# Patient Record
Sex: FEMALE | Race: WHITE | NOT HISPANIC OR LATINO | Employment: PART TIME | URBAN - METROPOLITAN AREA
[De-identification: names, ages, dates, MRNs, and addresses within clinical notes are randomized per-mention and may not be internally consistent; named-entity substitution may affect disease eponyms.]

---
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
## 2020-07-08 ENCOUNTER — OFFICE VISIT (OUTPATIENT)
Dept: CARDIOLOGY | Facility: MEDICAL CENTER | Age: 20
End: 2020-07-08
Payer: COMMERCIAL

## 2020-07-08 VITALS
SYSTOLIC BLOOD PRESSURE: 106 MMHG | HEART RATE: 77 BPM | BODY MASS INDEX: 18.88 KG/M2 | OXYGEN SATURATION: 97 % | DIASTOLIC BLOOD PRESSURE: 70 MMHG | WEIGHT: 100 LBS | HEIGHT: 61 IN

## 2020-07-08 DIAGNOSIS — Z00.00 PREVENTATIVE HEALTH CARE: ICD-10-CM

## 2020-07-08 DIAGNOSIS — Q23.1 BICUSPID AORTIC VALVE: ICD-10-CM

## 2020-07-08 DIAGNOSIS — Z87.74 HISTORY OF COARCTATION OF AORTA: ICD-10-CM

## 2020-07-08 PROCEDURE — 99213 OFFICE O/P EST LOW 20 MIN: CPT | Performed by: INTERNAL MEDICINE

## 2020-07-08 ASSESSMENT — ENCOUNTER SYMPTOMS
LOSS OF CONSCIOUSNESS: 0
STRIDOR: 0
COUGH: 0
CONSTITUTIONAL NEGATIVE: 1
EYES NEGATIVE: 1
BRUISES/BLEEDS EASILY: 0
GASTROINTESTINAL NEGATIVE: 1
CARDIOVASCULAR NEGATIVE: 1
MUSCULOSKELETAL NEGATIVE: 1
SHORTNESS OF BREATH: 0
WEAKNESS: 0
WHEEZING: 0
PALPITATIONS: 0
CLAUDICATION: 0
PND: 0
FEVER: 0
CHILLS: 0
ORTHOPNEA: 0
SORE THROAT: 0
HEMOPTYSIS: 0
SPUTUM PRODUCTION: 0
RESPIRATORY NEGATIVE: 1
DIZZINESS: 0
NEUROLOGICAL NEGATIVE: 1

## 2020-07-08 ASSESSMENT — FIBROSIS 4 INDEX: FIB4 SCORE: 0.32

## 2020-07-08 NOTE — PROGRESS NOTES
Chief Complaint   Patient presents with   • Follow-Up     Biscupid aortic valve       Subjective:   Jolly Stanton is a 19 y.o. female who presents today as a follow-up for bicuspid valve and report repaired coarctation.  Her last echocardiogram showed no residual stenosis and no significant stenosis across her aortic valve.  She has been functionally active not with limitations.  She has no chest pain.  Her blood pressure is controlled not on medications.  She is busy going to college at [employer] studying theater.    History reviewed. No pertinent past medical history.  History reviewed. No pertinent surgical history.  History reviewed. No pertinent family history.  Social History     Socioeconomic History   • Marital status: Single     Spouse name: Not on file   • Number of children: Not on file   • Years of education: Not on file   • Highest education level: Not on file   Occupational History   • Not on file   Social Needs   • Financial resource strain: Not on file   • Food insecurity     Worry: Not on file     Inability: Not on file   • Transportation needs     Medical: Not on file     Non-medical: Not on file   Tobacco Use   • Smoking status: Never Smoker   • Smokeless tobacco: Never Used   Substance and Sexual Activity   • Alcohol use: No   • Drug use: No   • Sexual activity: Not on file   Lifestyle   • Physical activity     Days per week: Not on file     Minutes per session: Not on file   • Stress: Not on file   Relationships   • Social connections     Talks on phone: Not on file     Gets together: Not on file     Attends [employer] service: Not on file     Active member of club or organization: Not on file     Attends meetings of clubs or organizations: Not on file     Relationship status: Not on file   • Intimate partner violence     Fear of current or ex partner: Not on file     Emotionally abused: Not on file     Physically abused: Not on file     Forced sexual activity: Not on file   Other Topics Concern  "  • Not on file   Social History Narrative   • Not on file     Allergies   Allergen Reactions   • Asa [Aspirin] Anaphylaxis     Itchy throat,wheezing    • Ibuprofen      Facial and throat swelling   • Penicillins      hives     No outpatient encounter medications on file as of 7/8/2020.     No facility-administered encounter medications on file as of 7/8/2020.      Review of Systems   Constitutional: Negative.  Negative for chills, fever and malaise/fatigue.   HENT: Negative.  Negative for sore throat.    Eyes: Negative.    Respiratory: Negative.  Negative for cough, hemoptysis, sputum production, shortness of breath, wheezing and stridor.    Cardiovascular: Negative.  Negative for chest pain, palpitations, orthopnea, claudication, leg swelling and PND.   Gastrointestinal: Negative.    Genitourinary: Negative.    Musculoskeletal: Negative.    Skin: Negative.    Neurological: Negative.  Negative for dizziness, loss of consciousness and weakness.   Endo/Heme/Allergies: Negative.  Does not bruise/bleed easily.   All other systems reviewed and are negative.       Objective:   /70 (BP Location: Right arm, Patient Position: Sitting, BP Cuff Size: Adult)   Pulse 77   Ht 1.549 m (5' 1\")   Wt 45.4 kg (100 lb)   SpO2 97%   BMI 18.89 kg/m²     Physical Exam   Constitutional: She appears well-developed and well-nourished. No distress.   HENT:   Head: Normocephalic and atraumatic.   Right Ear: External ear normal.   Left Ear: External ear normal.   Nose: Nose normal.   Mouth/Throat: No oropharyngeal exudate.   Eyes: Pupils are equal, round, and reactive to light. Conjunctivae and EOM are normal. Right eye exhibits no discharge. Left eye exhibits no discharge. No scleral icterus.   Neck: Neck supple. No JVD present.   Cardiovascular: Normal rate, regular rhythm and intact distal pulses. Exam reveals no gallop and no friction rub.   No murmur heard.  Pulmonary/Chest: Effort normal. No stridor. No respiratory distress. " She has no wheezes. She has no rales. She exhibits no tenderness.   Abdominal: Soft. She exhibits no distension. There is no guarding.   Musculoskeletal: Normal range of motion.         General: No tenderness, deformity or edema.   Neurological: She is alert. She has normal reflexes. She displays normal reflexes. No cranial nerve deficit. She exhibits normal muscle tone. Coordination normal.   Skin: Skin is warm and dry. No rash noted. She is not diaphoretic. No erythema. No pallor.   Psychiatric: She has a normal mood and affect. Her behavior is normal. Judgment and thought content normal.   Nursing note and vitals reviewed.      Assessment:     1. Bicuspid aortic valve     2. History of coarctation of aorta     3. Preventative health care         Medical Decision Making:  Today's Assessment / Status / Plan:   19-year-old female with bicuspid aortic valve disease with coarctation of the aorta.  At this point she is doing well.  We discussed her risk of COVID and that she is low risk and not at risk for the disease given her cardiac history.  She should follow the same precautions as everyone else.  She is otherwise doing well we will see her back in 12 months.

## 2020-07-30 ENCOUNTER — HOSPITAL ENCOUNTER (OUTPATIENT)
Dept: LAB | Facility: MEDICAL CENTER | Age: 20
End: 2020-07-30
Attending: FAMILY MEDICINE
Payer: COMMERCIAL

## 2020-07-30 ENCOUNTER — OFFICE VISIT (OUTPATIENT)
Dept: MEDICAL GROUP | Age: 20
End: 2020-07-30
Payer: COMMERCIAL

## 2020-07-30 VITALS
BODY MASS INDEX: 18.07 KG/M2 | DIASTOLIC BLOOD PRESSURE: 70 MMHG | HEIGHT: 62 IN | HEART RATE: 71 BPM | OXYGEN SATURATION: 100 % | TEMPERATURE: 96.9 F | WEIGHT: 98.2 LBS | SYSTOLIC BLOOD PRESSURE: 110 MMHG

## 2020-07-30 DIAGNOSIS — Z00.00 ANNUAL PHYSICAL EXAM: ICD-10-CM

## 2020-07-30 DIAGNOSIS — Z23 NEED FOR VACCINATION: ICD-10-CM

## 2020-07-30 DIAGNOSIS — R68.89 THIN BUILD: ICD-10-CM

## 2020-07-30 DIAGNOSIS — Z11.3 SCREENING EXAMINATION FOR SEXUALLY TRANSMITTED DISEASE: ICD-10-CM

## 2020-07-30 LAB
25(OH)D3 SERPL-MCNC: 30 NG/ML (ref 30–100)
ALBUMIN SERPL BCP-MCNC: 4.4 G/DL (ref 3.2–4.9)
ALBUMIN/GLOB SERPL: 1.9 G/DL
ALP SERPL-CCNC: 59 U/L (ref 30–99)
ALT SERPL-CCNC: 10 U/L (ref 2–50)
ANION GAP SERPL CALC-SCNC: 13 MMOL/L (ref 7–16)
AST SERPL-CCNC: 12 U/L (ref 12–45)
BILIRUB SERPL-MCNC: 0.2 MG/DL (ref 0.1–1.5)
BUN SERPL-MCNC: 12 MG/DL (ref 8–22)
CALCIUM SERPL-MCNC: 9.7 MG/DL (ref 8.5–10.5)
CHLORIDE SERPL-SCNC: 103 MMOL/L (ref 96–112)
CHOLEST SERPL-MCNC: 119 MG/DL (ref 100–199)
CO2 SERPL-SCNC: 22 MMOL/L (ref 20–33)
CREAT SERPL-MCNC: 0.67 MG/DL (ref 0.5–1.4)
ERYTHROCYTE [DISTWIDTH] IN BLOOD BY AUTOMATED COUNT: 36.5 FL (ref 35.9–50)
FASTING STATUS PATIENT QL REPORTED: NORMAL
GLOBULIN SER CALC-MCNC: 2.3 G/DL (ref 1.9–3.5)
GLUCOSE SERPL-MCNC: 86 MG/DL (ref 65–99)
HAV IGM SERPL QL IA: NORMAL
HBV CORE IGM SER QL: NORMAL
HBV SURFACE AG SER QL: NORMAL
HCT VFR BLD AUTO: 37.3 % (ref 37–47)
HCV AB SER QL: NORMAL
HDLC SERPL-MCNC: 52 MG/DL
HGB BLD-MCNC: 11.6 G/DL (ref 12–16)
HIV 1+2 AB+HIV1 P24 AG SERPL QL IA: NORMAL
LDLC SERPL CALC-MCNC: 48 MG/DL
MCH RBC QN AUTO: 25.4 PG (ref 27–33)
MCHC RBC AUTO-ENTMCNC: 31.1 G/DL (ref 33.6–35)
MCV RBC AUTO: 81.6 FL (ref 81.4–97.8)
PLATELET # BLD AUTO: 283 K/UL (ref 164–446)
PMV BLD AUTO: 10.8 FL (ref 9–12.9)
POTASSIUM SERPL-SCNC: 3.7 MMOL/L (ref 3.6–5.5)
PROT SERPL-MCNC: 6.7 G/DL (ref 6–8.2)
RBC # BLD AUTO: 4.57 M/UL (ref 4.2–5.4)
SODIUM SERPL-SCNC: 138 MMOL/L (ref 135–145)
T3FREE SERPL-MCNC: 3.04 PG/ML (ref 2–4.4)
T4 FREE SERPL-MCNC: 1.25 NG/DL (ref 0.93–1.7)
TREPONEMA PALLIDUM IGG+IGM AB [PRESENCE] IN SERUM OR PLASMA BY IMMUNOASSAY: NORMAL
TRIGL SERPL-MCNC: 96 MG/DL (ref 0–149)
TSH SERPL DL<=0.005 MIU/L-ACNC: 1.75 UIU/ML (ref 0.38–5.33)
VIT B12 SERPL-MCNC: 672 PG/ML (ref 211–911)
WBC # BLD AUTO: 7.2 K/UL (ref 4.8–10.8)

## 2020-07-30 PROCEDURE — 87491 CHLMYD TRACH DNA AMP PROBE: CPT

## 2020-07-30 PROCEDURE — 90471 IMMUNIZATION ADMIN: CPT | Performed by: FAMILY MEDICINE

## 2020-07-30 PROCEDURE — 84443 ASSAY THYROID STIM HORMONE: CPT

## 2020-07-30 PROCEDURE — 80074 ACUTE HEPATITIS PANEL: CPT

## 2020-07-30 PROCEDURE — 80061 LIPID PANEL: CPT

## 2020-07-30 PROCEDURE — 87389 HIV-1 AG W/HIV-1&-2 AB AG IA: CPT

## 2020-07-30 PROCEDURE — 84481 FREE ASSAY (FT-3): CPT

## 2020-07-30 PROCEDURE — 85027 COMPLETE CBC AUTOMATED: CPT

## 2020-07-30 PROCEDURE — 99395 PREV VISIT EST AGE 18-39: CPT | Mod: 25 | Performed by: FAMILY MEDICINE

## 2020-07-30 PROCEDURE — 84439 ASSAY OF FREE THYROXINE: CPT

## 2020-07-30 PROCEDURE — 87591 N.GONORRHOEAE DNA AMP PROB: CPT

## 2020-07-30 PROCEDURE — 90621 MENB-FHBP VACC 2/3 DOSE IM: CPT | Performed by: FAMILY MEDICINE

## 2020-07-30 PROCEDURE — 80053 COMPREHEN METABOLIC PANEL: CPT

## 2020-07-30 PROCEDURE — 82306 VITAMIN D 25 HYDROXY: CPT

## 2020-07-30 PROCEDURE — 86780 TREPONEMA PALLIDUM: CPT

## 2020-07-30 PROCEDURE — 36415 COLL VENOUS BLD VENIPUNCTURE: CPT

## 2020-07-30 PROCEDURE — 82607 VITAMIN B-12: CPT

## 2020-07-30 ASSESSMENT — LIFESTYLE VARIABLES
DURING THE PAST 12 MONTHS, ON HOW MANY DAYS DID YOU USE ANY TOBACCO OR NICOTINE PRODUCTS: 0
DURING THE PAST 12 MONTHS, ON HOW MANY DAYS DID YOU USE ANYTHING ELSE TO GET HIGH: 0
DURING THE PAST 12 MONTHS, ON HOW MANY DAYS DID YOU DRINK MORE THAN A FEW SIPS OF BEER, WINE, OR ANY DRINK CONTAINING ALCOHOL: 0
PART A TOTAL SCORE: 0
HAVE YOU EVER RIDDEN IN A CAR DRIVEN BY SOMEONE WHO WAS HIGH OR HAD BEEN USING ALCOHOL OR DRUGS: NO
DURING THE PAST 12 MONTHS, ON HOW MANY DAYS DID YOU USE ANY MARIJUANA: 0

## 2020-07-30 ASSESSMENT — FIBROSIS 4 INDEX: FIB4 SCORE: 0.32

## 2020-07-30 ASSESSMENT — PATIENT HEALTH QUESTIONNAIRE - PHQ9: CLINICAL INTERPRETATION OF PHQ2 SCORE: 0

## 2020-07-30 NOTE — LETTER
July 30, 2020       Patient: Jolly Stanton   YOB: 2000   Date of Visit: 7/30/2020         To Whom It May Concern:    In my medical opinion, I recommend that Jolly Stanton should only do online school   And not in person due to underlying heart condition.    If you have any questions or concerns, please don't hesitate to call [phone]        Sincerely,        Rafael Cedillo M.D.  Electronically Signed

## 2020-07-30 NOTE — PROGRESS NOTES
Subjective:     CC:   Chief Complaint   Patient presents with   • Annual Exam       HPI:   Jolly Stanton is a 19 y.o. female who presents for annual exam    Patient has GYN provider: No   Last Pap Smear: NA   H/O Abnormal Pap: No  Last Mammogram: NA  Last Bone Density Test: NA  Last Colorectal Cancer Screening: NA  Last Tdap:01/10/2012    Exercise: sporadic irregular exercise, <half hour walking weekly  Diet: picky eater    Patient's last menstrual period was 07/09/2020 (approximate).  Hx STDs: No  Birth control: none  Menses every month with 5 days with light bleeding.  Denies cramping.  No significant bloating/fluid retention, pelvic pain, or dyspareunia. No abnormal vaginal discharge.  No breast tenderness, mass, nipple discharge, changes in size or contour, or abnormal cyclic discomfort.    OB History   No obstetric history on file.      She  reports previously being sexually active.    She  has no past medical history on file.  She  has a past surgical history that includes other cardiac surgery and other cardiac surgery.    Family History   Problem Relation Age of Onset   • No Known Problems Mother    • Diabetes Father    • Hypertension Father    • No Known Problems Sister    • Asthma Brother      Social History     Tobacco Use   • Smoking status: Never Smoker   • Smokeless tobacco: Never Used   Substance Use Topics   • Alcohol use: No   • Drug use: No       Patient Active Problem List    Diagnosis Date Noted   • Thin build 07/30/2020   • Screening examination for sexually transmitted disease 01/11/2019   • Bicuspid aortic valve 01/11/2019   • History of coarctation of aorta 01/11/2019     Current Outpatient Medications   Medication Sig Dispense Refill   • norgestrel-ethinyl estradiol (LO-OVRAL) 0.3-30 MG-MCG Tab Take 1 Tab by mouth every day. 28 Tab 8     No current facility-administered medications for this visit.      Allergies   Allergen Reactions   • Asa [Aspirin] Anaphylaxis     Itchy  "throat,wheezing    • Ibuprofen      Facial and throat swelling   • Penicillins      hives       Review of Systems   Constitutional: Negative for fever, chills and malaise/fatigue.   HENT: Negative for congestion.    Eyes: Negative for pain.   Respiratory: Negative for cough and shortness of breath.    Cardiovascular: Negative for chest pain and leg swelling.   Gastrointestinal: Negative for nausea, vomiting, abdominal pain and diarrhea.   Genitourinary: Negative for dysuria and hematuria.   Skin: Negative for rash.   Neurological: Negative for dizziness, focal weakness and headaches.   Endo/Heme/Allergies: Does not bruise/bleed easily.   Psychiatric/Behavioral: Negative for depression.  The patient is not nervous/anxious.      Objective:   /70 (BP Location: Left arm, Patient Position: Sitting, BP Cuff Size: Adult)   Pulse 71   Temp 36.1 °C (96.9 °F) (Temporal)   Ht 1.575 m (5' 2\")   Wt 44.5 kg (98 lb 3.2 oz)   LMP 07/09/2020 (Approximate)   SpO2 100%   Breastfeeding No   BMI 17.96 kg/m²     Wt Readings from Last 4 Encounters:   07/30/20 44.5 kg (98 lb 3.2 oz) (2 %, Z= -1.98)*   07/08/20 45.4 kg (100 lb) (3 %, Z= -1.81)*   04/19/19 49.9 kg (110 lb) (19 %, Z= -0.89)*   01/11/19 52.6 kg (116 lb) (32 %, Z= -0.46)*     * Growth percentiles are based on CDC (Girls, 2-20 Years) data.            Physical Exam:  Constitutional: Well-developed and well-nourished. Not diaphoretic. No distress.   Skin: Skin is warm and dry. No rash noted.  Head: Atraumatic without lesions.  Eyes: Conjunctivae and extraocular motions are normal. Pupils are equal, round, and reactive to light. No scleral icterus.   Ears:  External ears unremarkable. Tympanic membranes clear and intact.  Nose: Nares patent. Septum midline. Turbinates without erythema nor edema. No discharge.   Mouth/Throat: Tongue normal. Oropharynx is clear and moist. Posterior pharynx without erythema or exudates.  Neck: Supple, trachea midline. Normal range of " motion. No thyromegaly present. No lymphadenopathy--cervical or supraclavicular.  Cardiovascular: Regular rate and rhythm, S1 and S2 without murmur, rubs, or gallops.      Abdomen: Soft, non tender, and without distention. Active bowel sounds in all four quadrants. No rebound, guarding, masses or HSM.    Extremities: No cyanosis, clubbing, erythema, nor edema. Distal pulses intact and symmetric.   Musculoskeletal: All major joints AROM full in all directions without pain.  Neurological: Alert and oriented x 3. Grossly non-focal. Strength and sensation grossly intact. DTRs 2+/3 and symmetric.   Psychiatric:  Behavior, mood, and affect are appropriate.      Assessment and Plan:     1. Annual physical exam    Health maintenance:    Labs per orders  Immunizations per orders  Patient counseled about skin care, diet, supplements, and exercise.  Discussed  STD prevention       Pt. Denied any  Personal history of or current deep vein thrombosis or pulmonary embolus)   Known thrombogenic mutations (eg, factor V Leiden mutation; prothrombin mutation; protein S, protein C,  Anticipated or recent major surgery with prolonged immobilization   History of stroke   Complicated valvular heart disease   History of or current ischemic heart disease (   diabetes mellitus with nephropathy, retinopathy, or neuropathy)   Active liver disease   History of or current benign or malignant liver tumor   Known or suspected carcinoma of the breast   Migraine with aura (with or without headache)  Known or suspected pregnancy       - Comp Metabolic Panel; Future  - CBC WITHOUT DIFFERENTIAL; Future  - Lipid Profile; Future  - TSH+FREE T4  - T3 FREE; Future  - VITAMIN D,25 HYDROXY; Future  - VITAMIN B12; Future  - norgestrel-ethinyl estradiol (LO-OVRAL) 0.3-30 MG-MCG Tab; Take 1 Tab by mouth every day.  Dispense: 28 Tab; Refill: 8    2. Need for vaccination    - Meningococcal Vaccine Serogroup B 2-3 Dose (TRUMENBA)    3. Thin build  We will obtain  routine labs including thyroid.    4. Screening examination for sexually transmitted disease    - Chlamydia & N. Gonorrhoeae By PCR; Future  - HIV AG/AB COMBO ASSAY SCREENING; Future  - HEPATITIS PANEL ACUTE (4 COMPONENTS); Future  - TRICHOMONAS VAGINALIS BY TMA; Future      Follow-up: Return in about 6 months (around 1/30/2021) for Reevaluation.

## 2020-09-29 DIAGNOSIS — Z00.00 ANNUAL PHYSICAL EXAM: ICD-10-CM

## 2020-09-29 NOTE — TELEPHONE ENCOUNTER
Received request via: Pharmacy    Was the patient seen in the last year in this department? Yes    Does the patient have an active prescription (recently filled or refills available) for medication(s) requested? Yes, Patient wants script sent to different pharmacy

## 2020-12-31 ENCOUNTER — TELEPHONE (OUTPATIENT)
Dept: CARDIOLOGY | Facility: MEDICAL CENTER | Age: 20
End: 2020-12-31

## 2020-12-31 NOTE — TELEPHONE ENCOUNTER
Informed Jolly that at this time patient is not having symptoms beyond sore throat and a headache, use Tylenol for pain, stay hydrated, and well fed. Consider pedialyte in oral intake is not great. If symptoms get worse such as SOB, dizzy, lightheaded, chest pain, may want to consider going to ER for monitoring.

## 2020-12-31 NOTE — TELEPHONE ENCOUNTER
RO    Pts mother Jolly called stating Pt has tested positive for Covid and has a bicuspid valve and is wondering if there is anything specific she should be doing. Please give Jolly a call back at [phone].    Thank you

## 2021-07-12 ENCOUNTER — TELEPHONE (OUTPATIENT)
Dept: CARDIOLOGY | Facility: MEDICAL CENTER | Age: 21
End: 2021-07-12

## 2021-07-12 NOTE — LETTER
PROCEDURE/SURGERY CLEARANCE FORM      Encounter Date: 2021    Patient: Jolly Stanton  YOB: 2000    CARDIOLOGIST: Jose Graham M.D.    REFERRING DOCTOR:  No ref. provider found    The above patient is cleared to have the following procedure/surgery:                                            Additional comments: Ok to proceed, low risk.         Electronically signed        MD Signature   Jose Graham M.D.

## 2021-07-12 NOTE — TELEPHONE ENCOUNTER
RO    Patient called and needs to get clearance for a procedure on Wednesday. There will be a moderate sedation. Please call her ASAP.    Thank you,    Lashonda LOVELL

## 2021-07-29 ENCOUNTER — TELEPHONE (OUTPATIENT)
Dept: CARDIOLOGY | Facility: MEDICAL CENTER | Age: 21
End: 2021-07-29

## 2021-07-29 DIAGNOSIS — Q23.1 BICUSPID AORTIC VALVE: ICD-10-CM

## 2021-07-29 NOTE — TELEPHONE ENCOUNTER
RO    Patient mom called and would like for RO to order an Echo if needed now so patient can get it done before appt on 8/11 as she goes back to college on 8/12. Please let them know if ordered. Please call mom Jolly [phone]    Thank you,    Lashonda LOVELL

## 2021-07-31 NOTE — TELEPHONE ENCOUNTER
You  Jose Graham M.D. 23 hours ago (5:04 PM)     To Ro, does patient need repeat Echo? will need to be ordered STAT.     Thanks    Message text      Jose Graham M.D.  You 23 minutes ago (4:39 PM)     Please report a repeat echocardiogram.    Message text      LVM for Jolly, provided number for imaging scheduler

## 2021-08-03 ENCOUNTER — HOSPITAL ENCOUNTER (OUTPATIENT)
Dept: CARDIOLOGY | Facility: MEDICAL CENTER | Age: 21
End: 2021-08-03
Attending: INTERNAL MEDICINE
Payer: COMMERCIAL

## 2021-08-03 DIAGNOSIS — Q23.1 BICUSPID AORTIC VALVE: ICD-10-CM

## 2021-08-03 LAB
LV EJECT FRACT  99904: 60
LV EJECT FRACT MOD 2C 99903: 62.56
LV EJECT FRACT MOD 4C 99902: 64.54
LV EJECT FRACT MOD BP 99901: 63.78

## 2021-08-03 PROCEDURE — 93306 TTE W/DOPPLER COMPLETE: CPT | Mod: 26 | Performed by: INTERNAL MEDICINE

## 2021-08-03 PROCEDURE — 93306 TTE W/DOPPLER COMPLETE: CPT

## 2021-08-06 ENCOUNTER — HOSPITAL ENCOUNTER (OUTPATIENT)
Dept: LAB | Facility: MEDICAL CENTER | Age: 21
End: 2021-08-06
Attending: FAMILY MEDICINE
Payer: COMMERCIAL

## 2021-08-06 ENCOUNTER — OFFICE VISIT (OUTPATIENT)
Dept: CARDIOLOGY | Facility: MEDICAL CENTER | Age: 21
End: 2021-08-06
Payer: COMMERCIAL

## 2021-08-06 ENCOUNTER — OFFICE VISIT (OUTPATIENT)
Dept: MEDICAL GROUP | Age: 21
End: 2021-08-06
Payer: COMMERCIAL

## 2021-08-06 VITALS
SYSTOLIC BLOOD PRESSURE: 90 MMHG | RESPIRATION RATE: 16 BRPM | DIASTOLIC BLOOD PRESSURE: 70 MMHG | WEIGHT: 100.2 LBS | TEMPERATURE: 98.7 F | HEIGHT: 62 IN | OXYGEN SATURATION: 97 % | HEART RATE: 96 BPM | BODY MASS INDEX: 18.44 KG/M2

## 2021-08-06 VITALS
SYSTOLIC BLOOD PRESSURE: 100 MMHG | DIASTOLIC BLOOD PRESSURE: 72 MMHG | OXYGEN SATURATION: 96 % | HEART RATE: 97 BPM | BODY MASS INDEX: 18.77 KG/M2 | HEIGHT: 62 IN | WEIGHT: 102 LBS

## 2021-08-06 DIAGNOSIS — Z87.74 HISTORY OF COARCTATION OF AORTA: ICD-10-CM

## 2021-08-06 DIAGNOSIS — Q23.1 BICUSPID AORTIC VALVE: ICD-10-CM

## 2021-08-06 DIAGNOSIS — Z11.3 SCREENING EXAMINATION FOR SEXUALLY TRANSMITTED DISEASE: ICD-10-CM

## 2021-08-06 DIAGNOSIS — Z00.00 ANNUAL PHYSICAL EXAM: ICD-10-CM

## 2021-08-06 LAB
25(OH)D3 SERPL-MCNC: 17 NG/ML (ref 30–100)
ALBUMIN SERPL BCP-MCNC: 4.5 G/DL (ref 3.2–4.9)
ALBUMIN/GLOB SERPL: 1.7 G/DL
ALP SERPL-CCNC: 76 U/L (ref 30–99)
ALT SERPL-CCNC: 11 U/L (ref 2–50)
ANION GAP SERPL CALC-SCNC: 10 MMOL/L (ref 7–16)
AST SERPL-CCNC: 15 U/L (ref 12–45)
BILIRUB SERPL-MCNC: 0.3 MG/DL (ref 0.1–1.5)
BUN SERPL-MCNC: 8 MG/DL (ref 8–22)
CALCIUM SERPL-MCNC: 10 MG/DL (ref 8.5–10.5)
CHLORIDE SERPL-SCNC: 104 MMOL/L (ref 96–112)
CHOLEST SERPL-MCNC: 152 MG/DL (ref 100–199)
CO2 SERPL-SCNC: 24 MMOL/L (ref 20–33)
CREAT SERPL-MCNC: 0.65 MG/DL (ref 0.5–1.4)
ERYTHROCYTE [DISTWIDTH] IN BLOOD BY AUTOMATED COUNT: 44.9 FL (ref 35.9–50)
FASTING STATUS PATIENT QL REPORTED: NORMAL
GLOBULIN SER CALC-MCNC: 2.7 G/DL (ref 1.9–3.5)
GLUCOSE SERPL-MCNC: 90 MG/DL (ref 65–99)
HAV IGM SERPL QL IA: NORMAL
HBV CORE IGM SER QL: NORMAL
HBV SURFACE AG SER QL: NORMAL
HCT VFR BLD AUTO: 40.4 % (ref 37–47)
HCV AB SER QL: NORMAL
HDLC SERPL-MCNC: 58 MG/DL
HGB BLD-MCNC: 12.2 G/DL (ref 12–16)
HIV 1+2 AB+HIV1 P24 AG SERPL QL IA: NORMAL
LDLC SERPL CALC-MCNC: 72 MG/DL
MCH RBC QN AUTO: 24 PG (ref 27–33)
MCHC RBC AUTO-ENTMCNC: 30.2 G/DL (ref 33.6–35)
MCV RBC AUTO: 79.4 FL (ref 81.4–97.8)
PLATELET # BLD AUTO: 323 K/UL (ref 164–446)
PMV BLD AUTO: 10.8 FL (ref 9–12.9)
POTASSIUM SERPL-SCNC: 3.9 MMOL/L (ref 3.6–5.5)
PROT SERPL-MCNC: 7.2 G/DL (ref 6–8.2)
RBC # BLD AUTO: 5.09 M/UL (ref 4.2–5.4)
SODIUM SERPL-SCNC: 138 MMOL/L (ref 135–145)
T3FREE SERPL-MCNC: 3.15 PG/ML (ref 2–4.4)
T4 FREE SERPL-MCNC: 1.19 NG/DL (ref 0.93–1.7)
TREPONEMA PALLIDUM IGG+IGM AB [PRESENCE] IN SERUM OR PLASMA BY IMMUNOASSAY: NORMAL
TRIGL SERPL-MCNC: 112 MG/DL (ref 0–149)
TSH SERPL DL<=0.005 MIU/L-ACNC: 1.24 UIU/ML (ref 0.38–5.33)
WBC # BLD AUTO: 8.3 K/UL (ref 4.8–10.8)

## 2021-08-06 PROCEDURE — 86780 TREPONEMA PALLIDUM: CPT

## 2021-08-06 PROCEDURE — 87389 HIV-1 AG W/HIV-1&-2 AB AG IA: CPT

## 2021-08-06 PROCEDURE — 99395 PREV VISIT EST AGE 18-39: CPT | Performed by: FAMILY MEDICINE

## 2021-08-06 PROCEDURE — 80061 LIPID PANEL: CPT

## 2021-08-06 PROCEDURE — 99213 OFFICE O/P EST LOW 20 MIN: CPT | Performed by: INTERNAL MEDICINE

## 2021-08-06 PROCEDURE — 36415 COLL VENOUS BLD VENIPUNCTURE: CPT

## 2021-08-06 PROCEDURE — 84439 ASSAY OF FREE THYROXINE: CPT

## 2021-08-06 PROCEDURE — 80074 ACUTE HEPATITIS PANEL: CPT

## 2021-08-06 PROCEDURE — 84481 FREE ASSAY (FT-3): CPT

## 2021-08-06 PROCEDURE — 82306 VITAMIN D 25 HYDROXY: CPT

## 2021-08-06 PROCEDURE — 80053 COMPREHEN METABOLIC PANEL: CPT

## 2021-08-06 PROCEDURE — 87591 N.GONORRHOEAE DNA AMP PROB: CPT

## 2021-08-06 PROCEDURE — 85027 COMPLETE CBC AUTOMATED: CPT

## 2021-08-06 PROCEDURE — 84443 ASSAY THYROID STIM HORMONE: CPT

## 2021-08-06 PROCEDURE — 87491 CHLMYD TRACH DNA AMP PROBE: CPT

## 2021-08-06 ASSESSMENT — ENCOUNTER SYMPTOMS
SPUTUM PRODUCTION: 0
RESPIRATORY NEGATIVE: 1
FEVER: 0
HEMOPTYSIS: 0
EYES NEGATIVE: 1
PND: 0
MUSCULOSKELETAL NEGATIVE: 1
CHILLS: 0
GASTROINTESTINAL NEGATIVE: 1
SHORTNESS OF BREATH: 0
WHEEZING: 0
ORTHOPNEA: 0
BRUISES/BLEEDS EASILY: 0
DIZZINESS: 0
CONSTITUTIONAL NEGATIVE: 1
WEAKNESS: 0
SORE THROAT: 0
CARDIOVASCULAR NEGATIVE: 1
STRIDOR: 0
COUGH: 0
LOSS OF CONSCIOUSNESS: 0
CLAUDICATION: 0
NEUROLOGICAL NEGATIVE: 1
PALPITATIONS: 0

## 2021-08-06 ASSESSMENT — PATIENT HEALTH QUESTIONNAIRE - PHQ9: CLINICAL INTERPRETATION OF PHQ2 SCORE: 0

## 2021-08-06 ASSESSMENT — LIFESTYLE VARIABLES
DURING THE PAST 12 MONTHS, ON HOW MANY DAYS DID YOU USE ANY TOBACCO OR NICOTINE PRODUCTS: 0
DURING THE PAST 12 MONTHS, ON HOW MANY DAYS DID YOU USE ANY MARIJUANA: 0
HAVE YOU EVER GOTTEN IN TROUBLE WHILE YOU WERE USING ALCOHOL OR DRUGS: NO
DO YOU EVER FORGET THINGS YOU DID WHILE USING ALCOHOL OR DRUGS: NO
DO YOU EVER USE ALCOHOL OR DRUGS TO RELAX, FEEL BETTER ABOUT YOURSELF, OR FIT IN: NO
DO YOUR FAMILY OR FRIENDS EVER TELL YOU THAT YOU SHOULD CUT DOWN ON YOUR DRINKING OR DRUG USE: NO
DURING THE PAST 12 MONTHS, ON HOW MANY DAYS DID YOU USE ANYTHING ELSE TO GET HIGH: 0
HAVE YOU EVER RIDDEN IN A CAR DRIVEN BY SOMEONE WHO WAS HIGH OR HAD BEEN USING ALCOHOL OR DRUGS: NO
DURING THE PAST 12 MONTHS, ON HOW MANY DAYS DID YOU DRINK MORE THAN A FEW SIPS OF BEER, WINE, OR ANY DRINK CONTAINING ALCOHOL: 2
PART A TOTAL SCORE: 2
DO YOU EVER USE ALCOHOL OR DRUGS WHILE YOU ARE BY YOURSELF ALONE: NO

## 2021-08-06 ASSESSMENT — FIBROSIS 4 INDEX
FIB4 SCORE: 0.27
FIB4 SCORE: 0.28

## 2021-08-06 NOTE — PROGRESS NOTES
Subjective:     CC:   Chief Complaint   Patient presents with   • Annual Exam       HPI:   Jolly Stanton is a 20 y.o. female who presents for annual exam    Patient has GYN provider: No   Last Pap Smear: age 21   H/O Abnormal Pap: No  Last Tdap: up to date  Received HPV series: Yes    Exercise: sporadic irregular exercise, <half hour walking weekly  Diet: regular      No LMP recorded. (Menstrual status: Irregular Menses).  Hx STDs: No  Birth control: OCP  Menses every month with 5 days with spotting bleeding.  Denies cramping.  No significant bloating/fluid retention, pelvic pain, or dyspareunia. No abnormal vaginal discharge.  No breast tenderness, mass, nipple discharge, changes in size or contour, or abnormal cyclic discomfort.    OB History   No obstetric history on file.      She  reports previously being sexually active.    She  has no past medical history on file.  She  has a past surgical history that includes other cardiac surgery and other cardiac surgery.    Family History   Problem Relation Age of Onset   • No Known Problems Mother    • Diabetes Father    • Hypertension Father    • No Known Problems Sister    • Asthma Brother      Social History     Tobacco Use   • Smoking status: Never Smoker   • Smokeless tobacco: Never Used   Vaping Use   • Vaping Use: Never used   Substance Use Topics   • Alcohol use: No   • Drug use: No       Patient Active Problem List    Diagnosis Date Noted   • Thin build 07/30/2020   • Screening examination for sexually transmitted disease 01/11/2019   • Bicuspid aortic valve 01/11/2019   • History of coarctation of aorta 01/11/2019     Current Outpatient Medications   Medication Sig Dispense Refill   • norgestrel-ethinyl estradiol (LO-OVRAL) 0.3-30 MG-MCG Tab Take 1 Tab by mouth every day. 28 Tab 8     No current facility-administered medications for this visit.     Allergies   Allergen Reactions   • Asa [Aspirin] Anaphylaxis     Itchy throat,wheezing    • Ibuprofen       "Facial and throat swelling   • Naproxen    • Penicillins      hives       Review of Systems   Constitutional: Negative for fever, chills and malaise/fatigue.   HENT: Negative for congestion.    Eyes: Negative for pain.   Respiratory: Negative for cough and shortness of breath.    Cardiovascular: Negative for chest pain and leg swelling.   Gastrointestinal: Negative for nausea, vomiting, abdominal pain and diarrhea.   Genitourinary: Negative for dysuria and hematuria.   Skin: Negative for rash.   Neurological: Negative for dizziness, focal weakness and headaches.   Endo/Heme/Allergies: Does not bruise/bleed easily.   Psychiatric/Behavioral: Negative for depression.  The patient is not nervous/anxious.      Objective:   BP (!) 90/70 (BP Location: Left arm, Patient Position: Sitting, BP Cuff Size: Adult)   Pulse 96   Temp 37.1 °C (98.7 °F) (Temporal)   Resp 16   Ht 1.575 m (5' 2\")   Wt 45.5 kg (100 lb 3.2 oz)   SpO2 97%   BMI 18.33 kg/m²     Wt Readings from Last 4 Encounters:   08/06/21 45.5 kg (100 lb 3.2 oz)   07/30/20 44.5 kg (98 lb 3.2 oz) (2 %, Z= -1.98)*   07/08/20 45.4 kg (100 lb) (3 %, Z= -1.81)*   04/19/19 49.9 kg (110 lb) (19 %, Z= -0.89)*     * Growth percentiles are based on CDC (Girls, 2-20 Years) data.           Physical Exam  Constitutional: Well-developed and well-nourished. Not diaphoretic. No distress.   Skin: Skin is warm and dry. No rash noted.  Head: Atraumatic without lesions.  Eyes: Conjunctivae and extraocular motions are normal. Pupils are equal, round, and reactive to light. No scleral icterus.   Ears:  External ears unremarkable. Tympanic membranes clear and intact.  Nose: Nares patent. Septum midline. Turbinates without erythema nor edema. No discharge.   Mouth/Throat: Tongue normal. Oropharynx is clear and moist. Posterior pharynx without erythema or exudates.  Neck: Supple, trachea midline. Normal range of motion. No thyromegaly present. No lymphadenopathy--cervical or " supraclavicular.  Cardiovascular: Regular rate and rhythm, S1 and S2 without murmur, rubs, or gallops.    Respiratory: Effort normal. Clear to auscultation throughout. No adventitious sounds.     Abdomen: Soft, non tender, and without distention. Active bowel sounds in all four quadrants. No rebound, guarding, masses or HSM.    Extremities: No cyanosis, clubbing, erythema, nor edema. Distal pulses intact and symmetric.   Musculoskeletal: All major joints AROM full in all directions without pain.  Neurological: Alert and oriented x 3. Grossly non-focal. Strength and sensation grossly intact. DTRs 2+/3 and symmetric.   Psychiatric:  Behavior, mood, and affect are appropriate.    Assessment and Plan:     1. Annual physical exam  - Comp Metabolic Panel; Future  - CBC WITHOUT DIFFERENTIAL; Future  - Lipid Profile; Future  - TSH+FREE T4  - T3 FREE; Future  - VITAMIN D,25 HYDROXY; Future    2. Screening examination for sexually transmitted disease  - HIV AG/AB COMBO ASSAY SCREENING; Future  - Chlamydia & N. Gonorrhoeae By PCR; Future  - HEPATITIS PANEL ACUTE (4 COMPONENTS); Future      Health maintenance:     Labs per orders  Immunizations per orders  Patient counseled about skin care, diet, supplements, and exercise.  Discussed  breast self exam     Follow-up: Return in about 6 months (around 2/6/2022) for Reevaluation.

## 2021-08-06 NOTE — PROGRESS NOTES
Chief Complaint   Patient presents with   • Aortic Stenosis     f/v dx: Bicuspid aortic valve       Subjective:   Jolly Stanton is a 19 y.o. female who presents today as a follow-up for bicuspid valve and report repaired coarctation.  Her last echocardiogram showed no residual stenosis and no significant stenosis across her aortic valve.    She was last seen she got Covid.  She does be physically active.  She had a repeat echocardiogram which was normal.  She has no chest pain or shortness of breath.  Her blood pressure is controlled.  No past medical history on file.  Past Surgical History:   Procedure Laterality Date   • OTHER CARDIAC SURGERY      coarctation repair   • OTHER CARDIAC SURGERY      bicuspid aortic valve surgery     Family History   Problem Relation Age of Onset   • No Known Problems Mother    • Diabetes Father    • Hypertension Father    • No Known Problems Sister    • Asthma Brother      Social History     Socioeconomic History   • Marital status: Single     Spouse name: Not on file   • Number of children: Not on file   • Years of education: Not on file   • Highest education level: Not on file   Occupational History   • Not on file   Tobacco Use   • Smoking status: Never Smoker   • Smokeless tobacco: Never Used   Vaping Use   • Vaping Use: Never used   Substance and Sexual Activity   • Alcohol use: No   • Drug use: No   • Sexual activity: Not Currently   Other Topics Concern   • Not on file   Social History Narrative   • Not on file     Social Determinants of Health     Financial Resource Strain:    • Difficulty of Paying Living Expenses:    Food Insecurity:    • Worried About Running Out of Food in the Last Year:    • Ran Out of Food in the Last Year:    Transportation Needs:    • Lack of Transportation (Medical):    • Lack of Transportation (Non-Medical):    Physical Activity:    • Days of Exercise per Week:    • Minutes of Exercise per Session:    Stress:    • Feeling of Stress :    Social  "Connections:    • Frequency of Communication with Friends and Family:    • Frequency of Social Gatherings with Friends and Family:    • Attends Denominational Services:    • Active Member of Clubs or Organizations:    • Attends Club or Organization Meetings:    • Marital Status:    Intimate Partner Violence:    • Fear of Current or Ex-Partner:    • Emotionally Abused:    • Physically Abused:    • Sexually Abused:      Allergies   Allergen Reactions   • Asa [Aspirin] Anaphylaxis     Itchy throat,wheezing    • Ibuprofen      Facial and throat swelling   • Naproxen    • Penicillins      hives     Outpatient Encounter Medications as of 8/6/2021   Medication Sig Dispense Refill   • norgestrel-ethinyl estradiol (LO-OVRAL) 0.3-30 MG-MCG Tab Take 1 Tab by mouth every day. 28 Tab 8     No facility-administered encounter medications on file as of 8/6/2021.     Review of Systems   Constitutional: Negative.  Negative for chills, fever and malaise/fatigue.   HENT: Negative.  Negative for sore throat.    Eyes: Negative.    Respiratory: Negative.  Negative for cough, hemoptysis, sputum production, shortness of breath, wheezing and stridor.    Cardiovascular: Negative.  Negative for chest pain, palpitations, orthopnea, claudication, leg swelling and PND.   Gastrointestinal: Negative.    Genitourinary: Negative.    Musculoskeletal: Negative.    Skin: Negative.    Neurological: Negative.  Negative for dizziness, loss of consciousness and weakness.   Endo/Heme/Allergies: Negative.  Does not bruise/bleed easily.   All other systems reviewed and are negative.       Objective:   /72 (BP Location: Right arm, Patient Position: Sitting, BP Cuff Size: Adult)   Pulse 97   Ht 1.575 m (5' 2\")   Wt 46.3 kg (102 lb)   SpO2 96%   BMI 18.66 kg/m²     Physical Exam   Constitutional: She appears well-developed. No distress.   HENT:   Head: Normocephalic and atraumatic.   Right Ear: External ear normal.   Left Ear: External ear normal.   Nose: " Nose normal.   Mouth/Throat: No oropharyngeal exudate.   Eyes: Pupils are equal, round, and reactive to light. Conjunctivae are normal. Right eye exhibits no discharge. Left eye exhibits no discharge. No scleral icterus.   Neck: No JVD present.   Cardiovascular: Normal rate and regular rhythm. Exam reveals no gallop and no friction rub.   No murmur heard.  Pulmonary/Chest: Effort normal. No stridor. No respiratory distress. She has no wheezes. She has no rales. She exhibits no tenderness.   Abdominal: Soft. She exhibits no distension. There is no guarding.   Musculoskeletal:         General: No tenderness or deformity. Normal range of motion.      Cervical back: Neck supple.   Neurological: She is alert. She has normal reflexes. She displays normal reflexes. No cranial nerve deficit. She exhibits normal muscle tone. Coordination normal.   Skin: Skin is warm and dry. No rash noted. She is not diaphoretic. No erythema. No pallor.   Psychiatric: Her behavior is normal. Judgment and thought content normal.   Nursing note and vitals reviewed.      Assessment:     1. Bicuspid aortic valve     2. History of coarctation of aorta         Medical Decision Making:  Today's Assessment / Status / Plan:   19-year-old female with bicuspid aortic valve disease with coarctation of the aorta.  I reviewed her echocardiogram with her.  It shows no progression of her stenosis or coarctation.  Bicuspid valve is stable.  She is doing well at this point.  We will see her back in 1 year.

## 2021-08-10 ENCOUNTER — HOSPITAL ENCOUNTER (OUTPATIENT)
Dept: LAB | Facility: MEDICAL CENTER | Age: 21
End: 2021-08-10
Attending: FAMILY MEDICINE
Payer: COMMERCIAL

## 2021-08-10 DIAGNOSIS — R71.8 LOW MEAN CORPUSCULAR VOLUME (MCV): ICD-10-CM

## 2021-08-10 PROCEDURE — 83021 HEMOGLOBIN CHROMOTOGRAPHY: CPT

## 2021-08-10 PROCEDURE — 36415 COLL VENOUS BLD VENIPUNCTURE: CPT

## 2021-08-12 LAB
HGB A1 MFR BLD: 97.3 % (ref 95–97.9)
HGB A2 MFR BLD: 2.4 % (ref 2–3.5)
HGB C MFR BLD: 0 % (ref 0–0)
HGB E MFR BLD: 0 % (ref 0–0)
HGB F MFR BLD: 0.3 % (ref 0–2.1)
HGB FRACT BLD ELPH-IMP: NORMAL
HGB OTHER MFR BLD: 0 % (ref 0–0)
HGB S BLD QL SOLY: NORMAL
HGB S MFR BLD: 0 % (ref 0–0)
PATH INTERP BLD-IMP: NORMAL

## 2021-10-01 DIAGNOSIS — Z00.00 ANNUAL PHYSICAL EXAM: ICD-10-CM

## 2021-10-05 DIAGNOSIS — Z00.00 ANNUAL PHYSICAL EXAM: ICD-10-CM

## 2021-11-29 DIAGNOSIS — Z00.00 ANNUAL PHYSICAL EXAM: ICD-10-CM

## 2022-03-09 ENCOUNTER — TELEPHONE (OUTPATIENT)
Dept: CARDIOLOGY | Facility: MEDICAL CENTER | Age: 22
End: 2022-03-09
Payer: COMMERCIAL

## 2022-03-09 NOTE — TELEPHONE ENCOUNTER
S/W Jolly, pt Jolly is having dental surgery to remove wisdom teeth in June. Wanted to know if she needed ABX prophylaxis. Confirmed with valve team HX of bicuspid aortic valve with coarctication repair requires ABX.     Mother stated pt had cleaning today without, advised she should follow up with dentist office to discuss.

## 2022-03-09 NOTE — TELEPHONE ENCOUNTER
VERO Zepeda,    This patient's mother Jolly called and wants to speak to a nurse about her daughters upcoming dental surgery. Can you please call her back at [phone].      Thank you,    JOSE L

## 2022-03-13 ENCOUNTER — PATIENT MESSAGE (OUTPATIENT)
Dept: MEDICAL GROUP | Age: 22
End: 2022-03-13
Payer: COMMERCIAL

## 2022-03-13 DIAGNOSIS — Z00.00 ANNUAL PHYSICAL EXAM: ICD-10-CM

## 2022-03-15 NOTE — PATIENT COMMUNICATION
Received request via: Patient    Was the patient seen in the last year in this department? Yes    Does the patient have an active prescription (recently filled or refills available) for medication(s) requested? Requesting 90 day supply per insurance.

## 2022-06-02 ENCOUNTER — TELEPHONE (OUTPATIENT)
Dept: CARDIOLOGY | Facility: MEDICAL CENTER | Age: 22
End: 2022-06-02
Payer: COMMERCIAL

## 2022-06-02 NOTE — TELEPHONE ENCOUNTER
Called patient to move their appointment with Dr. Graham on June 27th @9:00am from [facility] [address] to [place] [id number].  LVM to  to do so.

## 2022-06-08 ENCOUNTER — TELEPHONE (OUTPATIENT)
Dept: CARDIOLOGY | Facility: MEDICAL CENTER | Age: 22
End: 2022-06-08
Payer: COMMERCIAL

## 2022-06-08 ENCOUNTER — PATIENT MESSAGE (OUTPATIENT)
Dept: CARDIOLOGY | Facility: MEDICAL CENTER | Age: 22
End: 2022-06-08
Payer: COMMERCIAL

## 2022-06-08 NOTE — TELEPHONE ENCOUNTER
RO    Caller: Jolly Ramey (pt mother)    Fax clearance to [phone]    Procedure Name: Molar Extraction    Jolly (pt mother) states that pt has upcoming surgery prior to her appointment with RO and is needing clearance for this procedure. They would like to know if they can get the clearance before seeing RO on 7/25.  Please advise.    Thank you,  Sang MARTINO    Procedure Scheduled Date: 07/09/2022    Callback Number: [phone]

## 2022-06-24 ENCOUNTER — OFFICE VISIT (OUTPATIENT)
Dept: CARDIOLOGY | Facility: MEDICAL CENTER | Age: 22
End: 2022-06-24
Payer: COMMERCIAL

## 2022-06-24 VITALS
WEIGHT: 110 LBS | BODY MASS INDEX: 20.24 KG/M2 | OXYGEN SATURATION: 97 % | HEIGHT: 62 IN | RESPIRATION RATE: 16 BRPM | HEART RATE: 78 BPM | SYSTOLIC BLOOD PRESSURE: 108 MMHG | DIASTOLIC BLOOD PRESSURE: 62 MMHG

## 2022-06-24 DIAGNOSIS — Q23.1 BICUSPID AORTIC VALVE: ICD-10-CM

## 2022-06-24 DIAGNOSIS — Z87.74 HISTORY OF COARCTATION OF AORTA: ICD-10-CM

## 2022-06-24 PROCEDURE — 99214 OFFICE O/P EST MOD 30 MIN: CPT | Performed by: INTERNAL MEDICINE

## 2022-06-24 RX ORDER — CLINDAMYCIN HYDROCHLORIDE 300 MG/1
CAPSULE ORAL
COMMUNITY
Start: 2022-04-05 | End: 2022-06-28

## 2022-06-24 ASSESSMENT — ENCOUNTER SYMPTOMS
FEVER: 0
CONSTITUTIONAL NEGATIVE: 1
PALPITATIONS: 0
EYES NEGATIVE: 1
WHEEZING: 0
RESPIRATORY NEGATIVE: 1
WEAKNESS: 0
LOSS OF CONSCIOUSNESS: 0
SHORTNESS OF BREATH: 0
ORTHOPNEA: 0
CLAUDICATION: 0
HEMOPTYSIS: 0
BRUISES/BLEEDS EASILY: 0
SORE THROAT: 0
CARDIOVASCULAR NEGATIVE: 1
MUSCULOSKELETAL NEGATIVE: 1
CHILLS: 0
COUGH: 0
GASTROINTESTINAL NEGATIVE: 1
STRIDOR: 0
PND: 0
SPUTUM PRODUCTION: 0
NEUROLOGICAL NEGATIVE: 1
DIZZINESS: 0

## 2022-06-24 ASSESSMENT — FIBROSIS 4 INDEX: FIB4 SCORE: 0.29

## 2022-06-24 NOTE — PROGRESS NOTES
Chief Complaint   Patient presents with   • Aortic Stenosis     F/v dx: Bicuspid aortic valve       Subjective:   Jolly Stanton is a 21 y.o. female who presents today as a follow-up for bicuspid valve and report repaired coarctation.    Since she was last seen she has been doing well with no chest pain or shortness of breath or PND.  She is scheduled to have her wisdom teeth removed.  There is been some confusion around whether or not she needs dental prophylaxis.  She does not have any uncorrected cyanotic heart disease.  She did have a coarctation repair.      No past medical history on file.  Past Surgical History:   Procedure Laterality Date   • OTHER CARDIAC SURGERY      coarctation repair   • OTHER CARDIAC SURGERY      bicuspid aortic valve surgery     Family History   Problem Relation Age of Onset   • No Known Problems Mother    • Diabetes Father    • Hypertension Father    • No Known Problems Sister    • Asthma Brother      Social History     Socioeconomic History   • Marital status: Single     Spouse name: Not on file   • Number of children: Not on file   • Years of education: Not on file   • Highest education level: Not on file   Occupational History   • Not on file   Tobacco Use   • Smoking status: Never Smoker   • Smokeless tobacco: Never Used   Vaping Use   • Vaping Use: Never used   Substance and Sexual Activity   • Alcohol use: No   • Drug use: No   • Sexual activity: Not Currently   Other Topics Concern   • Not on file   Social History Narrative   • Not on file     Social Determinants of Health     Financial Resource Strain: Not on file   Food Insecurity: Not on file   Transportation Needs: Not on file   Physical Activity: Not on file   Stress: Not on file   Social Connections: Not on file   Intimate Partner Violence: Not on file   Housing Stability: Not on file     Allergies   Allergen Reactions   • Asa [Aspirin] Anaphylaxis     Itchy throat,wheezing    • Ibuprofen      Facial and throat  "swelling   • Naproxen    • Penicillins      hives     Outpatient Encounter Medications as of 6/24/2022   Medication Sig Dispense Refill   • clindamycin (CLEOCIN) 300 MG Cap TAKE 2 CAPSULES BY MOUTH 1 HOUR PRIOR TO DENTAL SURGERY     • norgestrel-ethinyl estradiol (LO-OVRAL) 0.3-30 MG-MCG Tab Take 1 Tablet by mouth every day. 84 Tablet 1     No facility-administered encounter medications on file as of 6/24/2022.     Review of Systems   Constitutional: Negative.  Negative for chills, fever and malaise/fatigue.   HENT: Negative.  Negative for sore throat.    Eyes: Negative.    Respiratory: Negative.  Negative for cough, hemoptysis, sputum production, shortness of breath, wheezing and stridor.    Cardiovascular: Negative.  Negative for chest pain, palpitations, orthopnea, claudication, leg swelling and PND.   Gastrointestinal: Negative.    Genitourinary: Negative.    Musculoskeletal: Negative.    Skin: Negative.    Neurological: Negative.  Negative for dizziness, loss of consciousness and weakness.   Endo/Heme/Allergies: Negative.  Does not bruise/bleed easily.   All other systems reviewed and are negative.       Objective:   /62 (BP Location: Left arm, Patient Position: Sitting, BP Cuff Size: Adult)   Pulse 78   Resp 16   Ht 1.575 m (5' 2\")   Wt 49.9 kg (110 lb)   SpO2 97%   BMI 20.12 kg/m²     Physical Exam  Vitals and nursing note reviewed.   Constitutional:       General: She is not in acute distress.     Appearance: She is well-developed. She is not diaphoretic.   HENT:      Head: Normocephalic and atraumatic.      Right Ear: External ear normal.      Left Ear: External ear normal.      Nose: Nose normal.      Mouth/Throat:      Pharynx: No oropharyngeal exudate.   Eyes:      General: No scleral icterus.        Right eye: No discharge.         Left eye: No discharge.      Conjunctiva/sclera: Conjunctivae normal.      Pupils: Pupils are equal, round, and reactive to light.   Neck:      Vascular: No JVD. "   Cardiovascular:      Rate and Rhythm: Normal rate and regular rhythm.      Heart sounds: No murmur heard.    No friction rub. No gallop.   Pulmonary:      Effort: Pulmonary effort is normal. No respiratory distress.      Breath sounds: No stridor. No wheezing or rales.   Chest:      Chest wall: No tenderness.   Abdominal:      General: There is no distension.      Palpations: Abdomen is soft.      Tenderness: There is no guarding.   Musculoskeletal:         General: No tenderness or deformity. Normal range of motion.      Cervical back: Neck supple.   Skin:     General: Skin is warm and dry.      Coloration: Skin is not pale.      Findings: No erythema or rash.   Neurological:      Mental Status: She is alert.      Cranial Nerves: No cranial nerve deficit.      Motor: No abnormal muscle tone.      Coordination: Coordination normal.      Deep Tendon Reflexes: Reflexes are normal and symmetric. Reflexes normal.   Psychiatric:         Behavior: Behavior normal.         Thought Content: Thought content normal.         Judgment: Judgment normal.         Assessment:     1. History of coarctation of aorta     2. Bicuspid aortic valve         Medical Decision Making:  Today's Assessment / Status / Plan:   21-year-old female with bicuspid aortic valve disease with coarctation of the aorta.  For her preop she is no higher risk than the average patient for periprocedural complications.  I would not recommend dental prophylaxis.  Otherwise we will see her back in 1 year.

## 2022-06-25 ENCOUNTER — PATIENT MESSAGE (OUTPATIENT)
Dept: CARDIOLOGY | Facility: MEDICAL CENTER | Age: 22
End: 2022-06-25
Payer: COMMERCIAL

## 2022-06-27 NOTE — PATIENT COMMUNICATION
Contacted Jaqueline Arrieta, Dr. Ramos's office, and was provided Fax # [phone]. Dr Graham's progress note detailing clearance from 6/24 faxed to this number.

## 2022-06-28 ENCOUNTER — HOSPITAL ENCOUNTER (OUTPATIENT)
Dept: LAB | Facility: MEDICAL CENTER | Age: 22
End: 2022-06-28
Attending: FAMILY MEDICINE
Payer: COMMERCIAL

## 2022-06-28 ENCOUNTER — OFFICE VISIT (OUTPATIENT)
Dept: MEDICAL GROUP | Age: 22
End: 2022-06-28
Payer: COMMERCIAL

## 2022-06-28 VITALS
RESPIRATION RATE: 16 BRPM | SYSTOLIC BLOOD PRESSURE: 108 MMHG | HEART RATE: 100 BPM | DIASTOLIC BLOOD PRESSURE: 56 MMHG | TEMPERATURE: 98.1 F | HEIGHT: 62 IN | OXYGEN SATURATION: 98 % | WEIGHT: 108.8 LBS | BODY MASS INDEX: 20.02 KG/M2

## 2022-06-28 DIAGNOSIS — R53.82 CHRONIC FATIGUE: ICD-10-CM

## 2022-06-28 DIAGNOSIS — Z23 NEED FOR VACCINATION: ICD-10-CM

## 2022-06-28 DIAGNOSIS — Z00.00 ANNUAL PHYSICAL EXAM: ICD-10-CM

## 2022-06-28 DIAGNOSIS — E55.9 VITAMIN D DEFICIENCY: ICD-10-CM

## 2022-06-28 DIAGNOSIS — E78.00 PURE HYPERCHOLESTEROLEMIA: ICD-10-CM

## 2022-06-28 LAB
ALBUMIN SERPL BCP-MCNC: 4.5 G/DL (ref 3.2–4.9)
ALBUMIN/GLOB SERPL: 1.8 G/DL
ALP SERPL-CCNC: 65 U/L (ref 30–99)
ALT SERPL-CCNC: 14 U/L (ref 2–50)
ANION GAP SERPL CALC-SCNC: 9 MMOL/L (ref 7–16)
AST SERPL-CCNC: 17 U/L (ref 12–45)
BASOPHILS # BLD AUTO: 1 % (ref 0–1.8)
BASOPHILS # BLD: 0.06 K/UL (ref 0–0.12)
BILIRUB SERPL-MCNC: 0.2 MG/DL (ref 0.1–1.5)
BUN SERPL-MCNC: 13 MG/DL (ref 8–22)
CALCIUM SERPL-MCNC: 9.4 MG/DL (ref 8.5–10.5)
CHLORIDE SERPL-SCNC: 102 MMOL/L (ref 96–112)
CHOLEST SERPL-MCNC: 122 MG/DL (ref 100–199)
CO2 SERPL-SCNC: 24 MMOL/L (ref 20–33)
CREAT SERPL-MCNC: 0.7 MG/DL (ref 0.5–1.4)
EOSINOPHIL # BLD AUTO: 0.15 K/UL (ref 0–0.51)
EOSINOPHIL NFR BLD: 2.4 % (ref 0–6.9)
ERYTHROCYTE [DISTWIDTH] IN BLOOD BY AUTOMATED COUNT: 43.2 FL (ref 35.9–50)
FASTING STATUS PATIENT QL REPORTED: NORMAL
GFR SERPLBLD CREATININE-BSD FMLA CKD-EPI: 126 ML/MIN/1.73 M 2
GLOBULIN SER CALC-MCNC: 2.5 G/DL (ref 1.9–3.5)
GLUCOSE SERPL-MCNC: 74 MG/DL (ref 65–99)
HCT VFR BLD AUTO: 40.1 % (ref 37–47)
HDLC SERPL-MCNC: 47 MG/DL
HGB BLD-MCNC: 12.2 G/DL (ref 12–16)
IMM GRANULOCYTES # BLD AUTO: 0.02 K/UL (ref 0–0.11)
IMM GRANULOCYTES NFR BLD AUTO: 0.3 % (ref 0–0.9)
LDLC SERPL CALC-MCNC: 58 MG/DL
LYMPHOCYTES # BLD AUTO: 2.38 K/UL (ref 1–4.8)
LYMPHOCYTES NFR BLD: 38.4 % (ref 22–41)
MCH RBC QN AUTO: 24.5 PG (ref 27–33)
MCHC RBC AUTO-ENTMCNC: 30.4 G/DL (ref 33.6–35)
MCV RBC AUTO: 80.5 FL (ref 81.4–97.8)
MONOCYTES # BLD AUTO: 0.48 K/UL (ref 0–0.85)
MONOCYTES NFR BLD AUTO: 7.7 % (ref 0–13.4)
NEUTROPHILS # BLD AUTO: 3.11 K/UL (ref 2–7.15)
NEUTROPHILS NFR BLD: 50.2 % (ref 44–72)
NRBC # BLD AUTO: 0 K/UL
NRBC BLD-RTO: 0 /100 WBC
PLATELET # BLD AUTO: 359 K/UL (ref 164–446)
PMV BLD AUTO: 10.6 FL (ref 9–12.9)
POTASSIUM SERPL-SCNC: 4.5 MMOL/L (ref 3.6–5.5)
PROT SERPL-MCNC: 7 G/DL (ref 6–8.2)
RBC # BLD AUTO: 4.98 M/UL (ref 4.2–5.4)
SODIUM SERPL-SCNC: 135 MMOL/L (ref 135–145)
TRIGL SERPL-MCNC: 87 MG/DL (ref 0–149)
WBC # BLD AUTO: 6.2 K/UL (ref 4.8–10.8)

## 2022-06-28 PROCEDURE — 99395 PREV VISIT EST AGE 18-39: CPT | Mod: 25 | Performed by: FAMILY MEDICINE

## 2022-06-28 PROCEDURE — 80053 COMPREHEN METABOLIC PANEL: CPT

## 2022-06-28 PROCEDURE — 85025 COMPLETE CBC W/AUTO DIFF WBC: CPT

## 2022-06-28 PROCEDURE — 90715 TDAP VACCINE 7 YRS/> IM: CPT | Performed by: FAMILY MEDICINE

## 2022-06-28 PROCEDURE — 84481 FREE ASSAY (FT-3): CPT

## 2022-06-28 PROCEDURE — 36415 COLL VENOUS BLD VENIPUNCTURE: CPT

## 2022-06-28 PROCEDURE — 90471 IMMUNIZATION ADMIN: CPT | Performed by: FAMILY MEDICINE

## 2022-06-28 PROCEDURE — 82306 VITAMIN D 25 HYDROXY: CPT

## 2022-06-28 PROCEDURE — 84439 ASSAY OF FREE THYROXINE: CPT

## 2022-06-28 PROCEDURE — 80061 LIPID PANEL: CPT

## 2022-06-28 PROCEDURE — 84443 ASSAY THYROID STIM HORMONE: CPT

## 2022-06-28 ASSESSMENT — FIBROSIS 4 INDEX: FIB4 SCORE: 0.29

## 2022-06-28 ASSESSMENT — PATIENT HEALTH QUESTIONNAIRE - PHQ9: CLINICAL INTERPRETATION OF PHQ2 SCORE: 0

## 2022-06-28 NOTE — PROGRESS NOTES
Subjective:     CC:   Chief Complaint   Patient presents with   • Annual Wellness Visit       HPI:   Jolly Stanton is a 21 y.o. female who presents for annual exam      1. Annual physical exam  See below        5. Need for vaccination  Due for TDAP      Patient has GYN provider: No   Last Pap Smear: overdue   H/O Abnormal Pap: No    Last Tdap: overdue  Received HPV series: Yes    Exercise: moderate regular exercise, aerobic < 3 days a week  Diet: regular      No LMP recorded. (Menstrual status: Irregular Menses).  Hx STDs: No  Birth control: OCP  Menses every month with 5 days with spotting bleeding.  Denies cramping.  No significant bloating/fluid retention, pelvic pain, or dyspareunia. No abnormal vaginal discharge.  No breast tenderness, mass, nipple discharge, changes in size or contour, or abnormal cyclic discomfort.    OB History   No obstetric history on file.      She  reports previously being sexually active.    She  has no past medical history on file.  She  has a past surgical history that includes other cardiac surgery and other cardiac surgery.    Family History   Problem Relation Age of Onset   • No Known Problems Mother    • Diabetes Father    • Hypertension Father    • No Known Problems Sister    • Asthma Brother      Social History     Tobacco Use   • Smoking status: Never Smoker   • Smokeless tobacco: Never Used   Vaping Use   • Vaping Use: Never used   Substance Use Topics   • Alcohol use: No   • Drug use: No       Patient Active Problem List    Diagnosis Date Noted   • Thin build 07/30/2020   • Screening examination for sexually transmitted disease 01/11/2019   • Bicuspid aortic valve 01/11/2019   • History of coarctation of aorta 01/11/2019     Current Outpatient Medications   Medication Sig Dispense Refill   • norgestrel-ethinyl estradiol (LO-OVRAL) 0.3-30 MG-MCG Tab Take 1 Tablet by mouth every day. 84 Tablet 1     No current facility-administered medications for this visit.  "    Allergies   Allergen Reactions   • Asa [Aspirin] Anaphylaxis     Itchy throat,wheezing    • Ibuprofen      Facial and throat swelling   • Naproxen    • Penicillins      hives       Review of Systems   Constitutional: Negative for fever, chills and malaise/fatigue.   HENT: Negative for congestion.    Eyes: Negative for pain.   Respiratory: Negative for cough and shortness of breath.    Cardiovascular: Negative for chest pain and leg swelling.   Gastrointestinal: Negative for nausea, vomiting, abdominal pain and diarrhea.   Genitourinary: Negative for dysuria and hematuria.   Skin: Negative for rash.   Neurological: Negative for dizziness, focal weakness and headaches.   Endo/Heme/Allergies: Does not bruise/bleed easily.   Psychiatric/Behavioral: Negative for depression.  The patient is not nervous/anxious.      Objective:   /56 (BP Location: Right arm, Patient Position: Sitting, BP Cuff Size: Adult)   Pulse 100   Temp 36.7 °C (98.1 °F) (Temporal)   Resp 16   Ht 1.575 m (5' 2\")   Wt 49.4 kg (108 lb 12.8 oz)   SpO2 98%   BMI 19.90 kg/m²     Wt Readings from Last 4 Encounters:   06/28/22 49.4 kg (108 lb 12.8 oz)   06/24/22 49.9 kg (110 lb)   08/06/21 46.3 kg (102 lb)   08/06/21 45.5 kg (100 lb 3.2 oz)           Physical Exam:  Constitutional: Well-developed and well-nourished. Not diaphoretic. No distress.   Skin: Skin is warm and dry. No rash noted.  Head: Atraumatic without lesions.  Eyes: Conjunctivae and extraocular motions are normal. Pupils are equal, round, and reactive to light. No scleral icterus.   Ears:  External ears unremarkable. Tympanic membranes clear and intact.  Nose: Nares patent. Septum midline. Turbinates without erythema nor edema. No discharge.   Mouth/Throat: Tongue normal. Oropharynx is clear and moist. Posterior pharynx without erythema or exudates.  Neck: Supple, trachea midline. Normal range of motion. No thyromegaly present. No lymphadenopathy--cervical or " supraclavicular.  Cardiovascular: Regular rate and rhythm, S1 and S2 without murmur, rubs, or gallops.    Respiratory: Effort normal. Clear to auscultation throughout. No adventitious sounds.     Abdomen: Soft, non tender, and without distention. Active bowel sounds in all four quadrants. No rebound, guarding, masses or HSM.    Extremities: No cyanosis, clubbing, erythema, nor edema. Distal pulses intact and symmetric.   Musculoskeletal: All major joints AROM full in all directions without pain.  Neurological: Alert and oriented x 3. Grossly non-focal. Strength and sensation grossly intact. DTRs 2+/3 and symmetric.   Psychiatric:  Behavior, mood, and affect are appropriate.    Assessment and Plan:     1. Annual physical exam    2. Pure hypercholesterolemia  - Comp Metabolic Panel; Future  - CBC WITH DIFFERENTIAL; Future  - Lipid Profile; Future    3. Chronic fatigue  - TSH+FREE T4  - T3 FREE; Future    4. Vitamin D deficiency  - VITAMIN D,25 HYDROXY; Future    5. Need for vaccination  - Tdap Vaccine =>6YO IM      Health maintenance:     Labs per orders  Immunizations per orders  Patient counseled about skin care, diet, supplements, and exercise.  Discussed  breast self exam     Follow-up: No follow-ups on file.

## 2022-06-29 LAB
25(OH)D3 SERPL-MCNC: 19 NG/ML (ref 30–100)
T3FREE SERPL-MCNC: 3.23 PG/ML (ref 2–4.4)
T4 FREE SERPL-MCNC: 1.38 NG/DL (ref 0.93–1.7)
TSH SERPL DL<=0.005 MIU/L-ACNC: 1.08 UIU/ML (ref 0.38–5.33)

## 2022-07-06 ENCOUNTER — HOSPITAL ENCOUNTER (OUTPATIENT)
Facility: MEDICAL CENTER | Age: 22
End: 2022-07-06
Attending: INTERNAL MEDICINE
Payer: COMMERCIAL

## 2022-07-06 ENCOUNTER — OFFICE VISIT (OUTPATIENT)
Dept: MEDICAL GROUP | Age: 22
End: 2022-07-06
Payer: COMMERCIAL

## 2022-07-06 VITALS
TEMPERATURE: 97.6 F | DIASTOLIC BLOOD PRESSURE: 58 MMHG | OXYGEN SATURATION: 99 % | BODY MASS INDEX: 19.98 KG/M2 | HEART RATE: 83 BPM | HEIGHT: 62 IN | SYSTOLIC BLOOD PRESSURE: 108 MMHG | WEIGHT: 108.6 LBS

## 2022-07-06 DIAGNOSIS — Z30.41 ORAL CONTRACEPTIVE USE: ICD-10-CM

## 2022-07-06 DIAGNOSIS — Z00.00 HEALTH CARE MAINTENANCE: ICD-10-CM

## 2022-07-06 DIAGNOSIS — Z12.4 SCREENING FOR MALIGNANT NEOPLASM OF CERVIX: ICD-10-CM

## 2022-07-06 DIAGNOSIS — Z01.419 WELL FEMALE EXAM WITH ROUTINE GYNECOLOGICAL EXAM: ICD-10-CM

## 2022-07-06 PROCEDURE — 99395 PREV VISIT EST AGE 18-39: CPT | Performed by: INTERNAL MEDICINE

## 2022-07-06 PROCEDURE — 88175 CYTOPATH C/V AUTO FLUID REDO: CPT

## 2022-07-06 ASSESSMENT — FIBROSIS 4 INDEX: FIB4 SCORE: 0.27

## 2022-07-06 NOTE — PROGRESS NOTES
CHIEF COMPLAINT  Chief Complaint   Patient presents with   • Gynecologic Exam     HPI  Jolly Stanton is a 21 y.o. female who presents today for the following     Screening Chlamydia: 8/2021  Periods: every month, ~ 4 days   -Excessive bleeding or cramping  OCP: Stopped for a few months, as she has not been sexually active recently.    Reviewed PMH, PSH, FH, SH, ALL, HCM/IMM, no changes  Reviewed MEDS, no changes    Patient Active Problem List    Diagnosis Date Noted   • Thin build 07/30/2020   • Screening examination for sexually transmitted disease 01/11/2019   • Bicuspid aortic valve 01/11/2019   • History of coarctation of aorta 01/11/2019     CURRENT MEDICATIONS  Current Outpatient Medications   Medication Sig Dispense Refill   • norgestrel-ethinyl estradiol (LO-OVRAL) 0.3-30 MG-MCG Tab Take 1 Tablet by mouth every day. 84 Tablet 1     No current facility-administered medications for this visit.     ALLERGIES  Allergies: Asa [aspirin], Ibuprofen, Naproxen, and Penicillins  PAST MEDICAL HISTORY  No past medical history on file.  SURGICAL HISTORY  She  has a past surgical history that includes other cardiac surgery and other cardiac surgery.  SOCIAL HISTORY  Social History     Tobacco Use   • Smoking status: Never Smoker   • Smokeless tobacco: Never Used   Vaping Use   • Vaping Use: Never used   Substance Use Topics   • Alcohol use: No   • Drug use: No     Social History     Social History Narrative   • Not on file     FAMILY HISTORY  Family History   Problem Relation Age of Onset   • No Known Problems Mother    • Diabetes Father    • Hypertension Father    • No Known Problems Sister    • Asthma Brother      Family Status   Relation Name Status   • Mo  Alive   • Fa  Alive   • Sis  Alive   • Bro  Alive       ROS   Constitutional: Negative for fever, chills, fatigue.  HENT: Negative for congestion, sore throat.  Eyes: Negative for vision problems.   Respiratory: Negative for cough, shortness of  "breath.  Cardiovascular: Negative for chest pain, palpitations.   Gastrointestinal: Negative for heartburn, nausea, abdominal pain.   Genitourinary: Negative for dysuria.  Musculoskeletal: Negative for significant myalgia, back and joint pain.   Skin: Negative for rash.   Neuro: Negative for dizziness, weakness and headaches.   Endo/Heme/Allergies: Does not bruise/bleed easily.   Psychiatric/Behavioral: Negative for depression.    PHYSICAL EXAM   Blood Pressure 108/58 (BP Location: Right arm, Patient Position: Sitting, BP Cuff Size: Adult)   Pulse 83   Temperature 36.4 °C (97.6 °F) (Temporal)   Height 1.575 m (5' 2\")   Weight 49.3 kg (108 lb 9.6 oz)   Oxygen Saturation 99%  Body mass index is 19.86 kg/m².  General:  NAD, well appearing  HEENT:   NC/AT, PERRLA, EOMI.  Cardiovascular: unlabored breathing, no peripheral cyanosis or swelling.  Lungs:   no respiratory distress.  Abdomen: non- distended.  Extremities:  No LE swelling.  Skin:  Warm, dry.  No erythema. No rash.   Neurologic: Alert & oriented x 3. CN II-XII grossly intact. No focal deficits.  Psychiatric:  Affect normal, mood normal, judgment normal.  GYN: No suprapubic tenderness.  Perineum and external genitalia normal without rash.   Vagina with without discharge, normal mucosa.  Cervix w/o visible lesions or discharge. No CMT  Uterus normal size, non-tender, no masses,   Adnexa w/o mass or tenderness.   PAP smear was obtained.    Labs     Labs are reviewed   Lab Results   Component Value Date/Time    CHOLSTRLTOT 122 06/28/2022 12:08 PM    LDL 58 06/28/2022 12:08 PM    HDL 47 06/28/2022 12:08 PM    TRIGLYCERIDE 87 06/28/2022 12:08 PM       Lab Results   Component Value Date/Time    SODIUM 135 06/28/2022 12:08 PM    POTASSIUM 4.5 06/28/2022 12:08 PM    CHLORIDE 102 06/28/2022 12:08 PM    CO2 24 06/28/2022 12:08 PM    GLUCOSE 74 06/28/2022 12:08 PM    BUN 13 06/28/2022 12:08 PM    CREATININE 0.70 06/28/2022 12:08 PM     Lab Results   Component Value " Date/Time    ALKPHOSPHAT 65 06/28/2022 12:08 PM    ASTSGOT 17 06/28/2022 12:08 PM    ALTSGPT 14 06/28/2022 12:08 PM    TBILIRUBIN 0.2 06/28/2022 12:08 PM      No results found for: HBA1C  No results found for: TSH  Lab Results   Component Value Date/Time    FREET4 1.38 06/28/2022 12:08 PM    FREET4 1.19 08/06/2021 08:19 AM       Lab Results   Component Value Date/Time    WBC 6.2 06/28/2022 12:08 PM    RBC 4.98 06/28/2022 12:08 PM    HEMOGLOBIN 12.2 06/28/2022 12:08 PM    HEMATOCRIT 40.1 06/28/2022 12:08 PM    MCV 80.5 (L) 06/28/2022 12:08 PM    MCH 24.5 (L) 06/28/2022 12:08 PM    MCHC 30.4 (L) 06/28/2022 12:08 PM    MPV 10.6 06/28/2022 12:08 PM    NEUTSPOLYS 50.20 06/28/2022 12:08 PM    LYMPHOCYTES 38.40 06/28/2022 12:08 PM    MONOCYTES 7.70 06/28/2022 12:08 PM    EOSINOPHILS 2.40 06/28/2022 12:08 PM    BASOPHILS 1.00 06/28/2022 12:08 PM     Imaging     None    Assessment and Plan     Jolly Stanton is a 21 y.o. female    1. Well female exam with routine gynecological exam  - THINPREP PAP,REFLEX HPV ON ASC-US ONLY; Future  2. Screening for malignant neoplasm of cervix  - THINPREP PAP,REFLEX HPV ON ASC-US ONLY; Future  3. Health care maintenance  - THINPREP PAP,REFLEX HPV ON ASC-US ONLY; Future    4. Oral contraceptive use  Per HPI    Followup: Return if symptoms worsen or fail to improve.    All questions are answered.    Please note that this dictation was created using voice recognition software, and that there might be errors of acosta and possibly content.

## 2022-07-07 DIAGNOSIS — Z00.00 HEALTH CARE MAINTENANCE: ICD-10-CM

## 2022-07-07 DIAGNOSIS — Z01.419 WELL FEMALE EXAM WITH ROUTINE GYNECOLOGICAL EXAM: ICD-10-CM

## 2022-07-07 DIAGNOSIS — Z12.4 SCREENING FOR MALIGNANT NEOPLASM OF CERVIX: ICD-10-CM

## 2022-07-07 LAB — CYTOLOGY REG CYTOL: NORMAL

## 2022-07-12 RX ORDER — CLINDAMYCIN HYDROCHLORIDE 300 MG/1
CAPSULE ORAL
COMMUNITY
Start: 2022-07-09 | End: 2022-07-19

## 2022-07-12 RX ORDER — CHLORHEXIDINE GLUCONATE ORAL RINSE 1.2 MG/ML
SOLUTION DENTAL
COMMUNITY
Start: 2022-07-09 | End: 2022-07-19

## 2022-07-12 RX ORDER — HYDROCODONE BITARTRATE AND ACETAMINOPHEN 5; 325 MG/1; MG/1
TABLET ORAL
COMMUNITY
Start: 2022-07-09

## 2022-07-19 ENCOUNTER — OFFICE VISIT (OUTPATIENT)
Dept: MEDICAL GROUP | Age: 22
End: 2022-07-19

## 2022-07-19 ENCOUNTER — HOSPITAL ENCOUNTER (OUTPATIENT)
Dept: LAB | Facility: MEDICAL CENTER | Age: 22
End: 2022-07-19
Attending: FAMILY MEDICINE
Payer: COMMERCIAL

## 2022-07-19 VITALS
TEMPERATURE: 98 F | WEIGHT: 108.2 LBS | DIASTOLIC BLOOD PRESSURE: 62 MMHG | RESPIRATION RATE: 16 BRPM | SYSTOLIC BLOOD PRESSURE: 106 MMHG | HEIGHT: 62 IN | BODY MASS INDEX: 19.91 KG/M2 | HEART RATE: 88 BPM | OXYGEN SATURATION: 93 %

## 2022-07-19 DIAGNOSIS — R71.8 MICROCYTOSIS: ICD-10-CM

## 2022-07-19 DIAGNOSIS — Z11.3 SCREENING EXAMINATION FOR SEXUALLY TRANSMITTED DISEASE: ICD-10-CM

## 2022-07-19 DIAGNOSIS — R87.612 LOW GRADE SQUAMOUS INTRAEPITHELIAL LESION ON CYTOLOGIC SMEAR OF CERVIX (LGSIL): ICD-10-CM

## 2022-07-19 LAB
HAV IGM SERPL QL IA: NORMAL
HBV CORE IGM SER QL: NORMAL
HBV SURFACE AG SER QL: NORMAL
HCV AB SER QL: NORMAL
HIV 1+2 AB+HIV1 P24 AG SERPL QL IA: NORMAL
IRON SATN MFR SERPL: 6 % (ref 15–55)
IRON SERPL-MCNC: 22 UG/DL (ref 40–170)
T PALLIDUM AB SER QL IA: NORMAL
TIBC SERPL-MCNC: 383 UG/DL (ref 250–450)
UIBC SERPL-MCNC: 361 UG/DL (ref 110–370)

## 2022-07-19 PROCEDURE — 99214 OFFICE O/P EST MOD 30 MIN: CPT | Performed by: FAMILY MEDICINE

## 2022-07-19 PROCEDURE — 86780 TREPONEMA PALLIDUM: CPT

## 2022-07-19 PROCEDURE — 87389 HIV-1 AG W/HIV-1&-2 AB AG IA: CPT

## 2022-07-19 PROCEDURE — 83540 ASSAY OF IRON: CPT

## 2022-07-19 PROCEDURE — 83550 IRON BINDING TEST: CPT

## 2022-07-19 PROCEDURE — 87591 N.GONORRHOEAE DNA AMP PROB: CPT

## 2022-07-19 PROCEDURE — 80074 ACUTE HEPATITIS PANEL: CPT

## 2022-07-19 PROCEDURE — 36415 COLL VENOUS BLD VENIPUNCTURE: CPT

## 2022-07-19 PROCEDURE — 83021 HEMOGLOBIN CHROMOTOGRAPHY: CPT

## 2022-07-19 PROCEDURE — 87661 TRICHOMONAS VAGINALIS AMPLIF: CPT

## 2022-07-19 PROCEDURE — 87491 CHLMYD TRACH DNA AMP PROBE: CPT

## 2022-07-19 RX ORDER — MULTIVIT-MIN/IRON/FOLIC ACID/K 18-600-40
1 CAPSULE ORAL DAILY
Qty: 90 CAPSULE | Refills: 1 | Status: SHIPPED | OUTPATIENT
Start: 2022-07-19 | End: 2022-10-17 | Stop reason: SDUPTHER

## 2022-07-19 ASSESSMENT — FIBROSIS 4 INDEX: FIB4 SCORE: 0.27

## 2022-07-19 NOTE — PROGRESS NOTES
This medical record contains text that has been entered with the assistance of computer voice recognition and dictation software.  Therefore, it may contain unintended errors in text, spelling, punctuation, or grammar      Chief Complaint   Patient presents with   • Follow-Up   • Lab Results     VITAMIN D Lab Results 6/28/22   • Requesting Labs     STD tests at lab         Jolly Stanton is a 21 y.o. female here evaluation and management of: Lab results      HPI:     1. Low grade squamous intraepithelial lesion on cytologic smear of cervix (LGSIL)  NEW UNDIAGNOSED PROBLEM    Jolly had her first Pap smear and it revealed LGSIL.  She is vaccinated with Gardasil completely.  She is sexually active with one male.  She uses birth control they do not use condoms.                         CERVICOVAGINAL CYTOLOGY REPORT       INTERPRETATION:   EPITHELIAL CELL ABNORMALITY.   LOW GRADE SQUAMOUS INTRAEPITHELIAL LESION.     SPECIMEN ADEQUACY:   Satisfactory for Evaluation.  Endocervical cells/transformation zone   component present.                                           Report electronically signed by:                                       DAVID SALAZAR DO     2. Microcytosis  Patient denies any weakness, no fatigue, no syncopal episodes.       Latest Reference Range & Units 06/28/22 12:08   Hemoglobin 12.0 - 16.0 g/dL 12.2   Hematocrit 37.0 - 47.0 % 40.1   MCV 81.4 - 97.8 fL 80.5 (L)   MCH 27.0 - 33.0 pg 24.5 (L)   MCHC 33.6 - 35.0 g/dL 30.4 (L)   (L): Data is abnormally low    3. Screening examination for sexually transmitted disease  Jolly would like to be screened for sexually transmitted infections.    Current medicines (including changes today)  Current Outpatient Medications   Medication Sig Dispense Refill   • Cholecalciferol (VITAMIN D) 50 MCG (2000 UT) Cap Take 1 Capsule by mouth every day. 90 Capsule 1   • HYDROcodone-acetaminophen (NORCO) 5-325 MG Tab per tablet TAKE 1 EVERY 6 HOURS IF NEEDED 3DAY NOT  "COVERED     • norgestrel-ethinyl estradiol (LO-OVRAL) 0.3-30 MG-MCG Tab Take 1 Tablet by mouth every day. 84 Tablet 1     No current facility-administered medications for this visit.     She  has no past medical history on file.  She  has a past surgical history that includes other cardiac surgery and other cardiac surgery.  Social History     Tobacco Use   • Smoking status: Never Smoker   • Smokeless tobacco: Never Used   Vaping Use   • Vaping Use: Never used   Substance Use Topics   • Alcohol use: No   • Drug use: No     Social History     Social History Narrative   • Not on file     Family History   Problem Relation Age of Onset   • No Known Problems Mother    • Diabetes Father    • Hypertension Father    • No Known Problems Sister    • Asthma Brother      Family Status   Relation Name Status   • Mo  Alive   • Fa  Alive   • Sis  Alive   • Bro  Alive         ROS    The pertinent  ROS findings can be seen in the HPI above.     All other systems reviewed and are negative     Objective:     /62 (BP Location: Right arm, Patient Position: Sitting, BP Cuff Size: Adult)   Pulse 88   Temp 36.7 °C (98 °F) (Temporal)   Resp 16   Ht 1.575 m (5' 2\")   Wt 49.1 kg (108 lb 3.2 oz)   SpO2 93%  Body mass index is 19.79 kg/m².      Physical Exam:    Constitutional: Alert, no distress.  Skin: No suspicious lesions  Eye: Equal, round and reactive, conjunctiva clear, lids normal.  ENMT: Lips without lesions, good dentition, oropharynx clear.  Neck: Trachea midline, no masses, no thyromegaly. No cervical or supraclavicular lymphadenopathy.  Respiratory: Unlabored respiratory effort, lungs clear to auscultation, no wheezes, no ronchi.  Cardiovascular: Normal S1, S2, no murmur, no edema  Abdomen: Soft, non-tender, no masses, no hepatosplenomegaly.        Assessment and Plan:   The following treatment plan was discussed      1. Low grade squamous intraepithelial lesion on cytologic smear of cervix (LGSIL)    Refer to Gyn for " further management    - Referral to Gynecology    2. Microcytosis    obtain iron studies as well as hemoglobin electrophoresis    - IRON/TOTAL IRON BIND; Future  - HEMOGLOBINOPATHY PROFILE; Future    3. Screening examination for sexually transmitted disease    Patietnt was given safe sex practices,  I also explained that standard Brianne’s used in clinical practice do not detect antibodies to HIV until 3-6wks after exposure to infection. Furthermore patient should ask sexual partner of their h/o STIs.      - HIV AG/AB COMBO ASSAY SCREENING; Future  - Chlamydia & N. Gonorrhoeae By PCR; Future  - RPR (SYPHILIS); Future  - HEPATITIS PANEL ACUTE (4 COMPONENTS); Future  - TRICHOMONAS VAGINALIS BY TMA; Future        Instructed to Follow up in clinic or ER for worsening symptoms, difficulty breathing, lack of expected recovery, or should new symptoms or problems arise.    Followup: Return in about 6 months (around 1/19/2023) for Reevaluation, labs.

## 2022-07-21 LAB
HGB A1 MFR BLD: 97.4 % (ref 95–97.9)
HGB A2 MFR BLD: 2.3 % (ref 2–3.5)
HGB C MFR BLD: 0 % (ref 0–0)
HGB E MFR BLD: 0 % (ref 0–0)
HGB F MFR BLD: 0.3 % (ref 0–2.1)
HGB FRACT BLD ELPH-IMP: NORMAL
HGB OTHER MFR BLD: 0 % (ref 0–0)
HGB S BLD QL SOLY: NORMAL
HGB S MFR BLD: 0 % (ref 0–0)
PATH INTERP BLD-IMP: NORMAL
SPEC CONTAINER SPEC: NORMAL
SPECIMEN SOURCE: NORMAL
T VAGINALIS RRNA SPEC QL NAA+PROBE: NEGATIVE

## 2022-10-18 RX ORDER — MULTIVIT-MIN/IRON/FOLIC ACID/K 18-600-40
1 CAPSULE ORAL DAILY
Qty: 90 CAPSULE | Refills: 1 | Status: SHIPPED | OUTPATIENT
Start: 2022-10-18

## 2022-12-21 ENCOUNTER — TELEPHONE (OUTPATIENT)
Dept: MEDICAL GROUP | Age: 22
End: 2022-12-21
Payer: COMMERCIAL

## 2022-12-21 DIAGNOSIS — Z00.00 ANNUAL PHYSICAL EXAM: ICD-10-CM

## 2022-12-22 ENCOUNTER — HOSPITAL ENCOUNTER (OUTPATIENT)
Dept: LAB | Facility: MEDICAL CENTER | Age: 22
End: 2022-12-22
Attending: FAMILY MEDICINE
Payer: COMMERCIAL

## 2022-12-22 DIAGNOSIS — Z00.00 ANNUAL PHYSICAL EXAM: ICD-10-CM

## 2022-12-22 LAB
BASOPHILS # BLD AUTO: 0.8 % (ref 0–1.8)
BASOPHILS # BLD: 0.06 K/UL (ref 0–0.12)
EOSINOPHIL # BLD AUTO: 0.14 K/UL (ref 0–0.51)
EOSINOPHIL NFR BLD: 2 % (ref 0–6.9)
ERYTHROCYTE [DISTWIDTH] IN BLOOD BY AUTOMATED COUNT: 42.5 FL (ref 35.9–50)
HCT VFR BLD AUTO: 41.3 % (ref 37–47)
HGB BLD-MCNC: 12.4 G/DL (ref 12–16)
IMM GRANULOCYTES # BLD AUTO: 0.03 K/UL (ref 0–0.11)
IMM GRANULOCYTES NFR BLD AUTO: 0.4 % (ref 0–0.9)
LYMPHOCYTES # BLD AUTO: 2.95 K/UL (ref 1–4.8)
LYMPHOCYTES NFR BLD: 41.8 % (ref 22–41)
MCH RBC QN AUTO: 23.8 PG (ref 27–33)
MCHC RBC AUTO-ENTMCNC: 30 G/DL (ref 33.6–35)
MCV RBC AUTO: 79.4 FL (ref 81.4–97.8)
MONOCYTES # BLD AUTO: 0.54 K/UL (ref 0–0.85)
MONOCYTES NFR BLD AUTO: 7.6 % (ref 0–13.4)
NEUTROPHILS # BLD AUTO: 3.34 K/UL (ref 2–7.15)
NEUTROPHILS NFR BLD: 47.4 % (ref 44–72)
NRBC # BLD AUTO: 0 K/UL
NRBC BLD-RTO: 0 /100 WBC
PLATELET # BLD AUTO: 315 K/UL (ref 164–446)
PMV BLD AUTO: 10.9 FL (ref 9–12.9)
RBC # BLD AUTO: 5.2 M/UL (ref 4.2–5.4)
WBC # BLD AUTO: 7.1 K/UL (ref 4.8–10.8)

## 2022-12-22 PROCEDURE — 80053 COMPREHEN METABOLIC PANEL: CPT

## 2022-12-22 PROCEDURE — 36415 COLL VENOUS BLD VENIPUNCTURE: CPT

## 2022-12-22 PROCEDURE — 85025 COMPLETE CBC W/AUTO DIFF WBC: CPT

## 2022-12-22 PROCEDURE — 82306 VITAMIN D 25 HYDROXY: CPT

## 2022-12-22 PROCEDURE — 80061 LIPID PANEL: CPT

## 2022-12-23 LAB
25(OH)D3 SERPL-MCNC: 29 NG/ML (ref 30–100)
ALBUMIN SERPL BCP-MCNC: 4.6 G/DL (ref 3.2–4.9)
ALBUMIN/GLOB SERPL: 1.6 G/DL
ALP SERPL-CCNC: 77 U/L (ref 30–99)
ALT SERPL-CCNC: 13 U/L (ref 2–50)
ANION GAP SERPL CALC-SCNC: 10 MMOL/L (ref 7–16)
AST SERPL-CCNC: 17 U/L (ref 12–45)
BILIRUB SERPL-MCNC: 0.2 MG/DL (ref 0.1–1.5)
BUN SERPL-MCNC: 16 MG/DL (ref 8–22)
CALCIUM ALBUM COR SERPL-MCNC: 9.2 MG/DL (ref 8.5–10.5)
CALCIUM SERPL-MCNC: 9.7 MG/DL (ref 8.5–10.5)
CHLORIDE SERPL-SCNC: 103 MMOL/L (ref 96–112)
CHOLEST SERPL-MCNC: 141 MG/DL (ref 100–199)
CO2 SERPL-SCNC: 24 MMOL/L (ref 20–33)
CREAT SERPL-MCNC: 0.74 MG/DL (ref 0.5–1.4)
FASTING STATUS PATIENT QL REPORTED: NORMAL
GFR SERPLBLD CREATININE-BSD FMLA CKD-EPI: 117 ML/MIN/1.73 M 2
GLOBULIN SER CALC-MCNC: 2.9 G/DL (ref 1.9–3.5)
GLUCOSE SERPL-MCNC: 79 MG/DL (ref 65–99)
HDLC SERPL-MCNC: 54 MG/DL
LDLC SERPL CALC-MCNC: 74 MG/DL
POTASSIUM SERPL-SCNC: 4.1 MMOL/L (ref 3.6–5.5)
PROT SERPL-MCNC: 7.5 G/DL (ref 6–8.2)
SODIUM SERPL-SCNC: 137 MMOL/L (ref 135–145)
TRIGL SERPL-MCNC: 66 MG/DL (ref 0–149)

## 2023-05-31 ENCOUNTER — APPOINTMENT (OUTPATIENT)
Dept: CARDIOLOGY | Facility: MEDICAL CENTER | Age: 23
End: 2023-05-31
Payer: COMMERCIAL

## 2023-07-28 ENCOUNTER — TELEPHONE (OUTPATIENT)
Dept: CARDIOLOGY | Facility: MEDICAL CENTER | Age: 23
End: 2023-07-28
Payer: COMMERCIAL

## 2023-07-28 NOTE — TELEPHONE ENCOUNTER
LVM for pt to call back to reschedule no show    Complex Repair And A-T Advancement Flap Text: The defect edges were debeveled with a #15 scalpel blade.  The primary defect was closed partially with a complex linear closure.  Given the location of the remaining defect, shape of the defect and the proximity to free margins an A-T advancement flap was deemed most appropriate for complete closure of the defect.  Using a sterile surgical marker, an appropriate advancement flap was drawn incorporating the defect and placing the expected incisions within the relaxed skin tension lines where possible.    The area thus outlined was incised deep to adipose tissue with a #15 scalpel blade.  The skin margins were undermined to an appropriate distance in all directions utilizing iris scissors.

## 2023-08-07 ENCOUNTER — TELEPHONE (OUTPATIENT)
Dept: MEDICAL GROUP | Age: 23
End: 2023-08-07
Payer: COMMERCIAL
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
